# Patient Record
Sex: MALE | Race: WHITE | NOT HISPANIC OR LATINO | Employment: OTHER | URBAN - METROPOLITAN AREA
[De-identification: names, ages, dates, MRNs, and addresses within clinical notes are randomized per-mention and may not be internally consistent; named-entity substitution may affect disease eponyms.]

---

## 2024-08-16 ENCOUNTER — TRANSFERRED RECORDS (OUTPATIENT)
Dept: HEALTH INFORMATION MANAGEMENT | Facility: CLINIC | Age: 69
End: 2024-08-16

## 2024-08-20 ENCOUNTER — PATIENT OUTREACH (OUTPATIENT)
Dept: ONCOLOGY | Facility: CLINIC | Age: 69
End: 2024-08-20

## 2024-08-20 ENCOUNTER — TRANSCRIBE ORDERS (OUTPATIENT)
Dept: OTHER | Age: 69
End: 2024-08-20

## 2024-08-20 DIAGNOSIS — R91.8 PULMONARY NODULES: Primary | ICD-10-CM

## 2024-08-20 DIAGNOSIS — R91.1 SOLITARY PULMONARY NODULE: Primary | ICD-10-CM

## 2024-08-20 NOTE — PROGRESS NOTES
New IP (Interventional Pulmonology) referral rec'd.  Chart reviewed.    IP (Interventional Pulmonology) team notified by IB message to ask add on for urgent request      New Patient: Interventional Pulmonary (Lung nodule) Nurse Navigator Note    Referring provider: Lb Espinal affiliated with [facility] at [address] [place] [address].     Referred to (specialty): Interventional Pulmonary (Lung nodule)    Requested provider (if applicable): n/a    Date Referral Received: 8/20/2024    Evaluation for :  Per referral, needs to be seen within the next 15 days. Need for robotic assisted bronchoscopy for biopsy of a left upper lobe speculated lesion  [facility] Authorization Number: [id number]    Clinical History (per Nurse review of records provided):    **BOOK MARKED**    7/31/2024:  CT Chest    [facility] [place])    RECORDS NEEDED:     Last FIVE years CHEST imaging pushed [facility] PACS [facility]  ALL imaging last 3 months from [facility]  Last ONE year PFT's, if any  --thank you!!    Additional testing needed prior to consult: PFT's

## 2024-08-21 ENCOUNTER — PRE VISIT (OUTPATIENT)
Dept: ONCOLOGY | Facility: CLINIC | Age: 69
End: 2024-08-21

## 2024-08-21 NOTE — TELEPHONE ENCOUNTER
RECORDS STATUS - ALL OTHER DIAGNOSIS      Action August 21, 2024 10:31 AM    Action Taken - Called [facility] and push image to  PACS.   - Called [facility] and ask for image to be push.     - Req is faxed to [facility], for records and images.      Imaging Received  August 21, 2024 10:41 AM    Action: Images from [facility] and [facility] received and resolved to PACS.     RECORDS NEEDED:     Last FIVE years CHEST imaging [facility]ACS [facility]  ALL imaging last 3 months from [facility]  Last ONE year PFT's, if any   RECORDS RECEIVED FROM: [facility]   NOTES STATUS DETAILS   OFFICE NOTE from referring provider External: [facility] Dr[facility] Lb Espinal   OFFICE NOTE from medical oncologist     OFFICE NOTE from other specialist     OPERATIVE REPORT     MEDICATION LIST External: [facility]    LABS     PATHOLOGY REPORTS     [facility] 8[facility]   PATHOLOGY FEDEX TRACKING   Tracking #:   IMAGING (NEED IMAGES & REPORT)     CT SCANS (Img req from /[facility]) RM:  8/19/24: CT Neck    [facility]:  6/23/24: CT Brain   ULTRASOUND (Img req from ) RM:  8/12/24: US FNA

## 2024-08-22 ENCOUNTER — TRANSFERRED RECORDS (OUTPATIENT)
Dept: HEALTH INFORMATION MANAGEMENT | Facility: CLINIC | Age: 69
End: 2024-08-22

## 2024-08-23 ENCOUNTER — VIRTUAL VISIT (OUTPATIENT)
Dept: PULMONOLOGY | Facility: CLINIC | Age: 69
End: 2024-08-23
Attending: INTERNAL MEDICINE
Payer: COMMERCIAL

## 2024-08-23 ENCOUNTER — HOSPITAL ENCOUNTER (OUTPATIENT)
Dept: GENERAL RADIOLOGY | Facility: CLINIC | Age: 69
Discharge: HOME | End: 2024-08-23
Attending: PHYSICIAN ASSISTANT
Payer: COMMERCIAL

## 2024-08-23 DIAGNOSIS — R91.1 SOLITARY PULMONARY NODULE: ICD-10-CM

## 2024-08-23 DIAGNOSIS — F17.208 NICOTINE DEPENDENCE WITH OTHER NICOTINE-INDUCED DISORDER, UNSPECIFIED NICOTINE PRODUCT TYPE: Primary | ICD-10-CM

## 2024-08-23 PROBLEM — B19.20 VIRAL HEPATITIS C: Status: ACTIVE | Noted: 2024-08-23

## 2024-08-23 PROBLEM — M54.50 LOW BACK PAIN: Status: ACTIVE | Noted: 2020-06-04

## 2024-08-23 PROBLEM — H52.4 PRESBYOPIA: Status: ACTIVE | Noted: 2024-08-23

## 2024-08-23 PROBLEM — I10 ESSENTIAL (PRIMARY) HYPERTENSION: Status: ACTIVE | Noted: 2024-08-23

## 2024-08-23 PROBLEM — D13.4 BENIGN NEOPLASM OF LIVER: Status: ACTIVE | Noted: 2024-08-23

## 2024-08-23 PROBLEM — H25.13 AGE-RELATED NUCLEAR CATARACT, BILATERAL: Status: ACTIVE | Noted: 2024-08-23

## 2024-08-23 PROBLEM — M19.90 UNSPECIFIED OSTEOARTHRITIS, UNSPECIFIED SITE: Status: ACTIVE | Noted: 2024-08-23

## 2024-08-23 PROBLEM — M19.011 ARTHRITIS OF RIGHT SHOULDER REGION: Status: ACTIVE | Noted: 2022-12-23

## 2024-08-23 PROBLEM — F10.10 ALCOHOL ABUSE: Status: ACTIVE | Noted: 2024-08-23

## 2024-08-23 PROBLEM — N40.0 BENIGN PROSTATIC HYPERPLASIA WITHOUT URINARY OBSTRUCTION: Status: ACTIVE | Noted: 2024-08-23

## 2024-08-23 PROBLEM — M75.101 TEAR OF RIGHT ROTATOR CUFF: Status: ACTIVE | Noted: 2024-08-23

## 2024-08-23 PROBLEM — I10 ESSENTIAL HYPERTENSION: Status: ACTIVE | Noted: 2024-08-23

## 2024-08-23 PROBLEM — M75.111 INCOMPLETE ROTATOR CUFF TEAR OR RUPTURE OF RIGHT SHOULDER, NOT SPECIFIED AS TRAUMATIC: Status: ACTIVE | Noted: 2024-08-23

## 2024-08-23 PROBLEM — R73.01 IMPAIRED FASTING GLUCOSE: Status: ACTIVE | Noted: 2024-08-23

## 2024-08-23 PROBLEM — R68.89 OTHER GENERAL SYMPTOMS AND SIGNS: Status: ACTIVE | Noted: 2024-08-23

## 2024-08-23 PROBLEM — H93.19 TINNITUS: Status: ACTIVE | Noted: 2024-08-23

## 2024-08-23 PROBLEM — S51.811D FOREARM LACERATION, RIGHT, SUBSEQUENT ENCOUNTER: Status: ACTIVE | Noted: 2021-10-08

## 2024-08-23 PROBLEM — M20.20 ACQUIRED HALLUX RIGIDUS: Status: ACTIVE | Noted: 2024-08-23

## 2024-08-23 PROBLEM — Z96.611 PRESENCE OF RIGHT ARTIFICIAL SHOULDER JOINT: Status: ACTIVE | Noted: 2024-08-23

## 2024-08-23 PROBLEM — M19.90 OSTEOARTHROSIS: Status: ACTIVE | Noted: 2024-08-23

## 2024-08-23 PROBLEM — R51.9 HEADACHE, UNSPECIFIED: Status: ACTIVE | Noted: 2024-08-23

## 2024-08-23 PROBLEM — E78.5 HYPERLIPIDEMIA: Status: ACTIVE | Noted: 2024-08-23

## 2024-08-23 PROBLEM — J01.90 ACUTE SINUSITIS, UNSPECIFIED: Status: ACTIVE | Noted: 2024-08-23

## 2024-08-23 PROCEDURE — 70491 CT SOFT TISSUE NECK W/DYE: CPT | Mod: 26 | Performed by: RADIOLOGY

## 2024-08-23 PROCEDURE — 99204 OFFICE O/P NEW MOD 45 MIN: CPT | Mod: 25 | Performed by: PHYSICIAN ASSISTANT

## 2024-08-23 PROCEDURE — 999N000122 CT MHEALTH OVERREAD

## 2024-08-23 PROCEDURE — 99406 BEHAV CHNG SMOKING 3-10 MIN: CPT | Performed by: PHYSICIAN ASSISTANT

## 2024-08-23 NOTE — NURSING NOTE
Current patient location: [address]  [facility] [address]    Is the patient currently in the state of [place]? YES    Visit mode:VIDEO    If the visit is dropped, the patient can be reconnected by: VIDEO VISIT: Text to cell phone:   Telephone Information:   Mobile [phone]       Will anyone else be joining the visit? Yes, spouse Crystal  (If patient encounters technical issues they should call [phone]    How would you like to obtain your AVS? MyChart    Are changes needed to the allergy or medication list? Yes Add low dose Aspirin, Vitamin D3, Dextromethorphan, Sinus rinse, Albuterol inhaler, Atorvastatin, Diltiazem, Flonase, Ibuprofen, Hydrochlorthiazide, Loratadine, Nicotene lozenges.      Are refills needed on medications prescribed by this physician? NO    Rooming Documentation:  Questionnaire(s) completed    Reason for visit: Consult    SIOBHAN HAMILTON

## 2024-08-23 NOTE — PROGRESS NOTES
LUNG NODULE & INTERVENTIONAL PULMONARY CLINIC  [facility], [facility]     Boby Ngo MRN# [id number]   Age: 68 year old YOB: 1955     Reason for Consultation: lung nodule(s)    Requesting Physician: Lb Espinal MD  [address] [place] [address]       Assessment and Plan:    1. 28mm MIMI spiculated mass. CT neck 7/19/2024 with partially visualized MIMI spiculated mass 24 x 21 mm. CT chest 7/31/2024 with persistent MIMI mass measuring 28mm. 8/19/2024 CT neck with partially imaged left upper lobe spiculated nodule. On further review, he had a CXR 3/2024 which does appear to demonstrate a MIMI mass but it is difficult to ascertain its size, not clearly seen on CXR 2/2015. It may be accessible via IR but since it is tethered to the adjacent thickened pleura I think a navigational bronchoscopy may be of higher yield. Recommend navigational bronchoscopy and biopsy of the MIMI nodule with EBUS/lymph node biopsy.  Also ordered CT [facility] overread to assess for other lung nodules.    Needs Pre-Op Anesthesia [facility] the [facility] which we discussed. Hold Celebrex, indomethacin, and Ibuprofen/all NSAIDS for 24 hours. Does not need to hold ASA 81mg daily.     During the patient's visit today we reviewed their imaging in detail. We discussed the process of an ion navigational bronchoscopy including need for a CT chest the day of the procedure for mapping purposes. We also reviewed possible risks of the procedure including pneumothorax, bleed, infection. Patient's wife is going to drive them to/from the procedure.  Will review results with patient over the phone, usually wihtin 1 week of the procedure. If positive for malignancy will order MRI brain and PET scan for staging, and referral to Oncology (through the [facility] or [facility])    2. Emphysema. Endorses ROBINS and cough. Scheduled for PFTs today at 3pm through the [facility]. Follow up with [facility] PCP regarding respiratory symptoms, may  benefit from LAMA-LABA. Continue current albuterol.     3. Tobacco use. I spent 5 minutes counseling patient on benefits of quitting tobacco. We discussed impact and health consequences of smoking. Patient states they are interested in quitting. He has nicotine lozenges and declined pharmacotherapy options today.     4. 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 Roldan over the phone, who agrees with the above plan.     Karen Anguiano PA-C  Interventional and General Pulmonology  Department of Pulmonary, Allergy, Critical Care and Sleep Medicine   [facility]     45 minutes spent reviewing chart, reviewing test results, talking with and examining patient, formulating plan, and documentation on the day of the encounter. Additional 5 min discussing smoking cessation.         History:     Boby Ngo is a 68 year old male with who is here for lung mass. Here with his wife.     He was having severe headaches in June 2024, had a CT head which looked normal but partially visualized a MIMI nodule. He also had a parotid lesion for which he underwent FNA 8/12/2024, cytology consistent with Warthin tumor. After the biopsy he had swelling/concern for hematoma.     He endorses shortness of breath if he walks or goes up and down steps. Once he sits down he is able to catch his breath. He has an albuterol inhaler which he takes PRN with over-exertion maybe 2-3x per day which helps. Endorses coughing attack worse in the morning and less at night, throughout the day its not that bad. Productive with clear phlegm.     - Personal hx of cancer: no  - Family hx of cancer: dad might have had prostate cancer but has limited information about his family history  - Exposure hx: Denies asbestos or radon exposure   - Tobacco hx: Current Smoker: 1ppd for 51years. Realizes he has to quit and has nicorette lozenges which he hasn't used yet but historically they were able. Patches  ineffective.   - My interpretation of the images relevant for this visit includes: MIMI spiculated nodule.    - My interpretation of the PFT's relevant for this visit includes: None       Other pertinent active medical problems include:   - HTN, HLD  - Alcohol use, drinking 4 beers per day   - hepatitis C in ~ 2013 s/p treatment  - liver hemangioma          Past Medical History:    No past medical history on file.        Past Surgical History:    No past surgical history on file.       Social History:     Social History     Tobacco Use    Smoking status: Not on file    Smokeless tobacco: Not on file   Substance Use Topics    Alcohol use: Not on file          Family History:   No family history on file.        Allergies:    Not on File       Medications:     No current outpatient medications on file.     No current facility-administered medications for this visit.            Physical Exam:   There were no vitals taken for this visit.  Wt Readings from Last 4 Encounters:   No data found for Wt     General: Well appearing  Lungs: Nonlabored breathing  Neuro: Answering questions appropriately  Psych: Normal affect       Imaging/Lab Data   All laboratory and imaging data reviewed.    No results found for this or any previous visit (from the past 24 hour(s)).

## 2024-08-23 NOTE — PROGRESS NOTES
Virtual Visit Details    Type of service:  Video Visit     Originating Location (pt. Location): Home    Distant Location (provider location):  On-site  Platform used for Video Visit: Lidia

## 2024-08-26 ENCOUNTER — TRANSFERRED RECORDS (OUTPATIENT)
Dept: HEALTH INFORMATION MANAGEMENT | Facility: CLINIC | Age: 69
End: 2024-08-26

## 2024-08-27 ENCOUNTER — TRANSFERRED RECORDS (OUTPATIENT)
Dept: HEALTH INFORMATION MANAGEMENT | Facility: CLINIC | Age: 69
End: 2024-08-27

## 2024-08-27 ENCOUNTER — TELEPHONE (OUTPATIENT)
Dept: PULMONOLOGY | Facility: CLINIC | Age: 69
End: 2024-08-27

## 2024-08-27 LAB
ALT SERPL-CCNC: 16 U/L (ref 0–55)
AST SERPL-CCNC: 32 U/L (ref 5–40)
CREATININE (EXTERNAL): 0.9 MG/DL (ref 0.5–1.5)
GFR ESTIMATED (EXTERNAL): >90 ML/MIN/1.73
GLUCOSE (EXTERNAL): 140 MG/DL (ref 70–180)
POTASSIUM (EXTERNAL): 4.8 MMOL/L (ref 3.5–5)

## 2024-08-27 NOTE — TELEPHONE ENCOUNTER
Writer contacted patient to schedule IP procedure. Scheduled 09/16 with Dr. Gutiérrez. Per patient, an H&P was completed 08/27. Packet information sent via [employer] and mail per patient request.    Carl Leyva on 8/27/2024 at 1:25 PM

## 2024-09-04 ENCOUNTER — TELEPHONE (OUTPATIENT)
Dept: PULMONOLOGY | Facility: CLINIC | Age: 69
End: 2024-09-04

## 2024-09-12 RX ORDER — ATORVASTATIN CALCIUM 20 MG/1
20 TABLET, FILM COATED ORAL AT BEDTIME
COMMUNITY
Start: 2024-03-19

## 2024-09-12 RX ORDER — DILTIAZEM HYDROCHLORIDE 120 MG/1
120 CAPSULE, EXTENDED RELEASE ORAL EVERY 12 HOURS
COMMUNITY
Start: 2024-07-03 | End: 2024-09-12

## 2024-09-12 RX ORDER — CELECOXIB 200 MG/1
200 CAPSULE ORAL 2 TIMES DAILY
COMMUNITY
End: 2024-09-12

## 2024-09-12 RX ORDER — DOXYCYCLINE HYCLATE 100 MG
100 TABLET ORAL 2 TIMES DAILY
COMMUNITY
Start: 2024-06-18

## 2024-09-12 RX ORDER — TAMSULOSIN HYDROCHLORIDE 0.4 MG/1
0.4 CAPSULE ORAL DAILY
COMMUNITY
Start: 2024-03-19

## 2024-09-12 RX ORDER — ASPIRIN 81 MG/1
81 TABLET, CHEWABLE ORAL DAILY
COMMUNITY
Start: 2023-03-24

## 2024-09-12 RX ORDER — CETIRIZINE HYDROCHLORIDE 10 MG/1
1 TABLET ORAL EVERY MORNING
COMMUNITY
Start: 2024-06-18 | End: 2024-09-12

## 2024-09-12 RX ORDER — LANOLIN ALCOHOL/MO/W.PET/CERES
3 CREAM (GRAM) TOPICAL
COMMUNITY
End: 2024-09-12

## 2024-09-12 RX ORDER — AMPICILLIN TRIHYDRATE 500 MG
1000 CAPSULE ORAL DAILY
COMMUNITY

## 2024-09-12 RX ORDER — LORATADINE 10 MG/1
10 TABLET ORAL DAILY PRN
COMMUNITY
Start: 2024-07-03 | End: 2024-09-12

## 2024-09-12 RX ORDER — FLUTICASONE PROPIONATE 50 MCG
2 SPRAY, SUSPENSION (ML) NASAL
COMMUNITY
Start: 2024-07-03

## 2024-09-12 RX ORDER — ALBUTEROL SULFATE 90 UG/1
2 AEROSOL, METERED RESPIRATORY (INHALATION) EVERY 4 HOURS PRN
COMMUNITY
Start: 2024-03-19

## 2024-09-12 RX ORDER — LISINOPRIL AND HYDROCHLOROTHIAZIDE 12.5; 2 MG/1; MG/1
1 TABLET ORAL DAILY
COMMUNITY
Start: 2024-03-19

## 2024-09-12 RX ORDER — RIZATRIPTAN BENZOATE 10 MG/1
10 TABLET ORAL
COMMUNITY
Start: 2024-07-03

## 2024-09-15 ENCOUNTER — ANESTHESIA EVENT (OUTPATIENT)
Dept: SURGERY | Facility: CLINIC | Age: 69
End: 2024-09-15
Payer: COMMERCIAL

## 2024-09-15 RX ORDER — ONDANSETRON 2 MG/ML
4 INJECTION INTRAMUSCULAR; INTRAVENOUS EVERY 30 MIN PRN
Status: CANCELLED | OUTPATIENT
Start: 2024-09-15

## 2024-09-15 RX ORDER — FENTANYL CITRATE 50 UG/ML
25 INJECTION, SOLUTION INTRAMUSCULAR; INTRAVENOUS
Status: CANCELLED | OUTPATIENT
Start: 2024-09-15

## 2024-09-15 RX ORDER — NALOXONE HYDROCHLORIDE 0.4 MG/ML
0.1 INJECTION, SOLUTION INTRAMUSCULAR; INTRAVENOUS; SUBCUTANEOUS
Status: CANCELLED | OUTPATIENT
Start: 2024-09-15

## 2024-09-15 RX ORDER — DEXAMETHASONE SODIUM PHOSPHATE 4 MG/ML
4 INJECTION, SOLUTION INTRA-ARTICULAR; INTRALESIONAL; INTRAMUSCULAR; INTRAVENOUS; SOFT TISSUE
Status: CANCELLED | OUTPATIENT
Start: 2024-09-15

## 2024-09-15 RX ORDER — ONDANSETRON 4 MG/1
4 TABLET, ORALLY DISINTEGRATING ORAL EVERY 30 MIN PRN
Status: CANCELLED | OUTPATIENT
Start: 2024-09-15

## 2024-09-15 RX ORDER — OXYCODONE HYDROCHLORIDE 5 MG/1
5 TABLET ORAL
Status: CANCELLED | OUTPATIENT
Start: 2024-09-15

## 2024-09-16 ENCOUNTER — ANESTHESIA (OUTPATIENT)
Dept: SURGERY | Facility: CLINIC | Age: 69
End: 2024-09-16
Payer: COMMERCIAL

## 2024-09-16 ENCOUNTER — TELEPHONE (OUTPATIENT)
Dept: PULMONOLOGY | Facility: CLINIC | Age: 69
End: 2024-09-16

## 2024-09-16 ENCOUNTER — APPOINTMENT (OUTPATIENT)
Dept: GENERAL RADIOLOGY | Facility: CLINIC | Age: 69
End: 2024-09-16
Attending: STUDENT IN AN ORGANIZED HEALTH CARE EDUCATION/TRAINING PROGRAM
Payer: COMMERCIAL

## 2024-09-16 ENCOUNTER — HOSPITAL ENCOUNTER (OUTPATIENT)
Dept: CT IMAGING | Facility: CLINIC | Age: 69
Discharge: HOME | End: 2024-09-16
Attending: PHYSICIAN ASSISTANT | Admitting: INTERNAL MEDICINE
Payer: COMMERCIAL

## 2024-09-16 ENCOUNTER — APPOINTMENT (OUTPATIENT)
Dept: GENERAL RADIOLOGY | Facility: CLINIC | Age: 69
End: 2024-09-16
Attending: INTERNAL MEDICINE
Payer: COMMERCIAL

## 2024-09-16 ENCOUNTER — HOSPITAL ENCOUNTER (OUTPATIENT)
Facility: CLINIC | Age: 69
Discharge: HOME | End: 2024-09-16
Attending: INTERNAL MEDICINE | Admitting: INTERNAL MEDICINE
Payer: COMMERCIAL

## 2024-09-16 VITALS
HEART RATE: 60 BPM | SYSTOLIC BLOOD PRESSURE: 132 MMHG | DIASTOLIC BLOOD PRESSURE: 72 MMHG | BODY MASS INDEX: 24.73 KG/M2 | TEMPERATURE: 98 F | RESPIRATION RATE: 15 BRPM | WEIGHT: 163.14 LBS | HEIGHT: 68 IN | OXYGEN SATURATION: 96 %

## 2024-09-16 DIAGNOSIS — R91.1 SOLITARY PULMONARY NODULE: ICD-10-CM

## 2024-09-16 PROCEDURE — 999N000179 XR SURGERY CARM FLUORO LESS THAN 5 MIN W STILLS: Mod: TC

## 2024-09-16 PROCEDURE — 88172 CYTP DX EVAL FNA 1ST EA SITE: CPT | Mod: 26 | Performed by: PATHOLOGY

## 2024-09-16 PROCEDURE — 31653 BRONCH EBUS SAMPLNG 3/> NODE: CPT | Mod: GC | Performed by: INTERNAL MEDICINE

## 2024-09-16 PROCEDURE — 250N000009 HC RX 250

## 2024-09-16 PROCEDURE — 250N000009 HC RX 250: Performed by: NURSE ANESTHETIST, CERTIFIED REGISTERED

## 2024-09-16 PROCEDURE — 999N000141 HC STATISTIC PRE-PROCEDURE NURSING ASSESSMENT: Performed by: INTERNAL MEDICINE

## 2024-09-16 PROCEDURE — 31625 BRONCHOSCOPY W/BIOPSY(S): CPT | Performed by: ANESTHESIOLOGY

## 2024-09-16 PROCEDURE — 71045 X-RAY EXAM CHEST 1 VIEW: CPT | Mod: 26 | Performed by: RADIOLOGY

## 2024-09-16 PROCEDURE — 710N000012 HC RECOVERY PHASE 2, PER MINUTE: Performed by: INTERNAL MEDICINE

## 2024-09-16 PROCEDURE — 31654 BRONCH EBUS IVNTJ PERPH LES: CPT | Mod: GC | Performed by: INTERNAL MEDICINE

## 2024-09-16 PROCEDURE — 31629 BRONCHOSCOPY/NEEDLE BX EACH: CPT | Mod: GC | Performed by: INTERNAL MEDICINE

## 2024-09-16 PROCEDURE — 88341 IMHCHEM/IMCYTCHM EA ADD ANTB: CPT | Mod: 26 | Performed by: PATHOLOGY

## 2024-09-16 PROCEDURE — 31627 NAVIGATIONAL BRONCHOSCOPY: CPT | Mod: GC | Performed by: INTERNAL MEDICINE

## 2024-09-16 PROCEDURE — 258N000003 HC RX IP 258 OP 636: Performed by: NURSE ANESTHETIST, CERTIFIED REGISTERED

## 2024-09-16 PROCEDURE — 250N000013 HC RX MED GY IP 250 OP 250 PS 637

## 2024-09-16 PROCEDURE — 88305 TISSUE EXAM BY PATHOLOGIST: CPT | Mod: 26 | Performed by: PATHOLOGY

## 2024-09-16 PROCEDURE — 88344 IMHCHEM/IMCYTCHM EA MLT ANTB: CPT | Mod: TC,XU | Performed by: INTERNAL MEDICINE

## 2024-09-16 PROCEDURE — 71250 CT THORAX DX C-: CPT

## 2024-09-16 PROCEDURE — 360N000087 HC SURGERY LEVEL 7 W/ FLUORO, PER MIN: Performed by: INTERNAL MEDICINE

## 2024-09-16 PROCEDURE — 71250 CT THORAX DX C-: CPT | Mod: 26 | Performed by: RADIOLOGY

## 2024-09-16 PROCEDURE — 250N000011 HC RX IP 250 OP 636: Performed by: NURSE ANESTHETIST, CERTIFIED REGISTERED

## 2024-09-16 PROCEDURE — 710N000010 HC RECOVERY PHASE 1, LEVEL 2, PER MIN: Performed by: INTERNAL MEDICINE

## 2024-09-16 PROCEDURE — 88173 CYTOPATH EVAL FNA REPORT: CPT | Mod: 26 | Performed by: PATHOLOGY

## 2024-09-16 PROCEDURE — 250N000025 HC SEVOFLURANE, PER MIN: Performed by: INTERNAL MEDICINE

## 2024-09-16 PROCEDURE — 88342 IMHCHEM/IMCYTCHM 1ST ANTB: CPT | Mod: 26 | Performed by: PATHOLOGY

## 2024-09-16 PROCEDURE — 999N000065 XR CHEST PORT 1 VIEW

## 2024-09-16 PROCEDURE — 272N000001 HC OR GENERAL SUPPLY STERILE: Performed by: INTERNAL MEDICINE

## 2024-09-16 PROCEDURE — 370N000017 HC ANESTHESIA TECHNICAL FEE, PER MIN: Performed by: INTERNAL MEDICINE

## 2024-09-16 PROCEDURE — 31625 BRONCHOSCOPY W/BIOPSY(S): CPT | Performed by: NURSE ANESTHETIST, CERTIFIED REGISTERED

## 2024-09-16 PROCEDURE — 88360 TUMOR IMMUNOHISTOCHEM/MANUAL: CPT | Mod: 26 | Performed by: PATHOLOGY

## 2024-09-16 RX ORDER — FENTANYL CITRATE 50 UG/ML
50 INJECTION, SOLUTION INTRAMUSCULAR; INTRAVENOUS EVERY 5 MIN PRN
Status: DISCONTINUED | OUTPATIENT
Start: 2024-09-16 | End: 2024-09-16 | Stop reason: HOSPADM

## 2024-09-16 RX ORDER — VASOPRESSIN IN 0.9 % NACL 2 UNIT/2ML
SYRINGE (ML) INTRAVENOUS PRN
Status: DISCONTINUED | OUTPATIENT
Start: 2024-09-16 | End: 2024-09-16

## 2024-09-16 RX ORDER — LIDOCAINE 40 MG/G
CREAM TOPICAL
Status: DISCONTINUED | OUTPATIENT
Start: 2024-09-16 | End: 2024-09-16 | Stop reason: HOSPADM

## 2024-09-16 RX ORDER — SODIUM CHLORIDE, SODIUM LACTATE, POTASSIUM CHLORIDE, CALCIUM CHLORIDE 600; 310; 30; 20 MG/100ML; MG/100ML; MG/100ML; MG/100ML
INJECTION, SOLUTION INTRAVENOUS CONTINUOUS PRN
Status: DISCONTINUED | OUTPATIENT
Start: 2024-09-16 | End: 2024-09-16

## 2024-09-16 RX ORDER — HYDROMORPHONE HCL IN WATER/PF 6 MG/30 ML
0.2 PATIENT CONTROLLED ANALGESIA SYRINGE INTRAVENOUS EVERY 5 MIN PRN
Status: DISCONTINUED | OUTPATIENT
Start: 2024-09-16 | End: 2024-09-16 | Stop reason: HOSPADM

## 2024-09-16 RX ORDER — SODIUM CHLORIDE, SODIUM LACTATE, POTASSIUM CHLORIDE, CALCIUM CHLORIDE 600; 310; 30; 20 MG/100ML; MG/100ML; MG/100ML; MG/100ML
INJECTION, SOLUTION INTRAVENOUS CONTINUOUS
Status: DISCONTINUED | OUTPATIENT
Start: 2024-09-16 | End: 2024-09-16 | Stop reason: HOSPADM

## 2024-09-16 RX ORDER — DEXAMETHASONE SODIUM PHOSPHATE 4 MG/ML
INJECTION, SOLUTION INTRA-ARTICULAR; INTRALESIONAL; INTRAMUSCULAR; INTRAVENOUS; SOFT TISSUE PRN
Status: DISCONTINUED | OUTPATIENT
Start: 2024-09-16 | End: 2024-09-16

## 2024-09-16 RX ORDER — ONDANSETRON 2 MG/ML
INJECTION INTRAMUSCULAR; INTRAVENOUS PRN
Status: DISCONTINUED | OUTPATIENT
Start: 2024-09-16 | End: 2024-09-16

## 2024-09-16 RX ORDER — FENTANYL CITRATE 50 UG/ML
INJECTION, SOLUTION INTRAMUSCULAR; INTRAVENOUS PRN
Status: DISCONTINUED | OUTPATIENT
Start: 2024-09-16 | End: 2024-09-16

## 2024-09-16 RX ORDER — PROPOFOL 10 MG/ML
INJECTION, EMULSION INTRAVENOUS CONTINUOUS PRN
Status: DISCONTINUED | OUTPATIENT
Start: 2024-09-16 | End: 2024-09-16

## 2024-09-16 RX ORDER — PROPOFOL 10 MG/ML
INJECTION, EMULSION INTRAVENOUS PRN
Status: DISCONTINUED | OUTPATIENT
Start: 2024-09-16 | End: 2024-09-16

## 2024-09-16 RX ORDER — NALOXONE HYDROCHLORIDE 0.4 MG/ML
0.1 INJECTION, SOLUTION INTRAMUSCULAR; INTRAVENOUS; SUBCUTANEOUS
Status: DISCONTINUED | OUTPATIENT
Start: 2024-09-16 | End: 2024-09-16 | Stop reason: HOSPADM

## 2024-09-16 RX ORDER — DEXAMETHASONE SODIUM PHOSPHATE 4 MG/ML
4 INJECTION, SOLUTION INTRA-ARTICULAR; INTRALESIONAL; INTRAMUSCULAR; INTRAVENOUS; SOFT TISSUE
Status: DISCONTINUED | OUTPATIENT
Start: 2024-09-16 | End: 2024-09-16 | Stop reason: HOSPADM

## 2024-09-16 RX ORDER — FENTANYL CITRATE 50 UG/ML
25 INJECTION, SOLUTION INTRAMUSCULAR; INTRAVENOUS EVERY 5 MIN PRN
Status: DISCONTINUED | OUTPATIENT
Start: 2024-09-16 | End: 2024-09-16 | Stop reason: HOSPADM

## 2024-09-16 RX ORDER — HYDROMORPHONE HCL IN WATER/PF 6 MG/30 ML
0.4 PATIENT CONTROLLED ANALGESIA SYRINGE INTRAVENOUS EVERY 5 MIN PRN
Status: DISCONTINUED | OUTPATIENT
Start: 2024-09-16 | End: 2024-09-16 | Stop reason: HOSPADM

## 2024-09-16 RX ADMIN — MIDAZOLAM 1 MG: 1 INJECTION INTRAMUSCULAR; INTRAVENOUS at 13:01

## 2024-09-16 RX ADMIN — PHENYLEPHRINE HYDROCHLORIDE 100 MCG: 10 INJECTION INTRAVENOUS at 13:38

## 2024-09-16 RX ADMIN — Medication 10 MG: at 14:10

## 2024-09-16 RX ADMIN — SUGAMMADEX 200 MG: 100 INJECTION, SOLUTION INTRAVENOUS at 14:25

## 2024-09-16 RX ADMIN — FENTANYL CITRATE 100 MCG: 50 INJECTION INTRAMUSCULAR; INTRAVENOUS at 13:14

## 2024-09-16 RX ADMIN — ONDANSETRON 4 MG: 2 INJECTION INTRAMUSCULAR; INTRAVENOUS at 14:23

## 2024-09-16 RX ADMIN — SODIUM CHLORIDE, POTASSIUM CHLORIDE, SODIUM LACTATE AND CALCIUM CHLORIDE: 600; 310; 30; 20 INJECTION, SOLUTION INTRAVENOUS at 14:00

## 2024-09-16 RX ADMIN — SODIUM CHLORIDE, POTASSIUM CHLORIDE, SODIUM LACTATE AND CALCIUM CHLORIDE: 600; 310; 30; 20 INJECTION, SOLUTION INTRAVENOUS at 13:01

## 2024-09-16 RX ADMIN — LIDOCAINE HYDROCHLORIDE 3 ML: 40 INJECTION, SOLUTION RETROBULBAR; TOPICAL at 15:43

## 2024-09-16 RX ADMIN — DEXAMETHASONE SODIUM PHOSPHATE 4 MG: 4 INJECTION, SOLUTION INTRA-ARTICULAR; INTRALESIONAL; INTRAMUSCULAR; INTRAVENOUS; SOFT TISSUE at 13:01

## 2024-09-16 RX ADMIN — Medication 1 LOZENGE: at 14:50

## 2024-09-16 RX ADMIN — PROPOFOL 150 MCG/KG/MIN: 10 INJECTION, EMULSION INTRAVENOUS at 13:20

## 2024-09-16 RX ADMIN — Medication 0.5 UNITS: at 13:42

## 2024-09-16 RX ADMIN — Medication 1 UNITS: at 14:04

## 2024-09-16 RX ADMIN — Medication 50 MG: at 13:14

## 2024-09-16 RX ADMIN — PROPOFOL 150 MG: 10 INJECTION, EMULSION INTRAVENOUS at 13:14

## 2024-09-16 RX ADMIN — HYDROMORPHONE HYDROCHLORIDE 0.5 MG: 1 INJECTION, SOLUTION INTRAMUSCULAR; INTRAVENOUS; SUBCUTANEOUS at 13:14

## 2024-09-16 ASSESSMENT — ACTIVITIES OF DAILY LIVING (ADL)
ADLS_ACUITY_SCORE: 31

## 2024-09-16 ASSESSMENT — COPD QUESTIONNAIRES: COPD: 1

## 2024-09-16 ASSESSMENT — LIFESTYLE VARIABLES: TOBACCO_USE: 1

## 2024-09-16 NOTE — ANESTHESIA CARE TRANSFER NOTE
Patient: Boby Ngo    Procedure: Procedure(s):  BRONCHOSCOPY, ROBOT-ASSISTED, WITH BIOPSY Left Upper Lobe mass and Endobronchial Ultrasound/lymph node biopsy       Diagnosis: Solitary pulmonary nodule [R91.1]  Diagnosis Additional Information: No value filed.    Anesthesia Type:   General     Note:    Oropharynx: oropharynx clear of all foreign objects  Level of Consciousness: awake    Level of Supplemental Oxygen (L/min / FiO2): 6  Independent Airway: airway patency satisfactory and stable  Dentition: dentition unchanged  Vital Signs Stable: post-procedure vital signs reviewed and stable  Report to RN Given: handoff report given  Patient transferred to: PACU    Handoff Report: Identifed the Patient, Identified the Reponsible Provider, Reviewed the pertinent medical history, Discussed the surgical course, Reviewed Intra-OP anesthesia mangement and issues during anesthesia, Set expectations for post-procedure period and Allowed opportunity for questions and acknowledgement of understanding    Vitals:  Vitals Value Taken Time   /88 09/16/24 1436   Temp     Pulse 79 09/16/24 1438   Resp 17 09/16/24 1438   SpO2 97 % 09/16/24 1438   Vitals shown include unfiled device data.    Electronically Signed By: MOO Barnes CRNA  September 16, 2024  2:39 PM

## 2024-09-16 NOTE — PROCEDURES
INTERVENTIONAL PULMONOLOGY       Procedure(s):    A flexible bronchoscopy  Airway exam  EBUS-TBNA (5 sites)  Fluoroscopic guidance   Radial EBUS Navigation (1 sites)  Therapeutic suctioning (2 sites)  Transbronchial fine needle aspiration (1 sites)  ION Robotic Bronchoscopy     Indication:  MIMI nodule     Attending of Record:  Remberto Gutiérrez MD    Interventional Pulmonary Fellow   Bonny Arreola    Trainees Present:   None     Medications:    General Anesthesia - See anesthesia flowsheet for details    Sedation Time:   Per Anesthesia Care Provider    Time Out:  Performed    The patient's medical record has been reviewed.  The indication for the procedure was reviewed.  The necessary history and physical examination was performed and reviewed.  The risks, benefits and alternatives of the procedure were discussed with the the patient in detail and he had the opportunity to ask questions.  I discussed in particular the potential complications including risks of minor or life-threatening bleeding and/or infection, respiratory failure, vocal cord trauma / paralysis, pneumothorax, and discomfort. Sedation risks were also discussed including abnormal heart rhythms, low blood pressure, and respiratory failure. All questions were answered to the best of my ability.  Verbal and written informed consent was obtained.  The proposed procedure and the patient's identification were verified prior to the procedure by the physician and the nurse.    The patient was assessed for the adequacy for the procedure and to receive medications.   Mental Status:  Alert and oriented x 3  Airway examination:  Class I (Complete visualization of soft palate)  Pulmonary:  Non labored respirations  CV:  Regular rate  ASA Grade:  (II)  Mild systemic disease    After clinical evaluation and reviewing the indication, risks, alternatives and benefits of the procedure the patient was deemed to be in satisfactory condition to undergo the  procedure.      Immediately before administration of medications the patient was re-assessed for adequacy to receive sedatives including the heart rate, respiratory rate, mental status, oxygen saturation, blood pressure and adequacy of pulmonary ventilation. These same parameters were continuously monitored throughout the procedure.    A Tuberculosis risk assessment was performed:  The patient has no known RISK of Tuberculosis    The procedure was performed in a negative airflow room: The patient could not be moved to a negative airflow room because of needed OR for the procedure    Maneuvers / Procedure:      Airway Examination: A complete airway examination was performed from the distal trachea to the subsegmental level in each lobe of both lungs.  Pertinent findings include normal airways.         ION Robotic Bronchoscopy: Planning was performed on the laptop prior to the procedure. The ION successfully completed its pre-procedural check phase. The ION arm was oriented towards the ETT and docked successfully. The robotic catheter was inserted into the ETT and the guide was clamped down. Registration was then completed successfully. The catheter was advanced towards the MIMI Anterior lesion/nodule using the vision probe. Local confirmation of the lesion utilized with ENB-Targeting, Radial EBUS, and Fluoroscopy. Radial EBUS navigation: The 1.8mm 20MHz radial probe was inserted into the bronchoscope and used to navigate to the lesion. The lesion was concentric by ultrasound and fluoroscopy was used to confirm placement of the probe The lesion was biopsied using 22G FNA Needle. A total of 7 passes were performed. VALENTE was present throughout the procedure. EBL was minimal. Epinephrine was not administered.  Guide catheter and vision probe was removed successfully. The ION platform was undocked without issues.       Station 2R; not evaluated .   Station 2L; not evaluated .   Station 4R; visualized and biopsied. A total  of 4 biopsies were performed using 22G FNA Needle. .   Station 4L; visualized and biopsied. A total of 4 biopsies were performed using 22G FNA Needle. .   Station 7; visualized and biopsied. A total of 4 biopsies were performed using 22G FNA Needle. .   Station 10R; not evaluated .   Station 10L; not evaluated .   Station 11R; visualized and biopsied. A total of 4 biopsies were performed using 22G FNA Needle. .   Station 11L; visualized and biopsied. A total of 4 biopsies were performed using 22G FNA Needle. .   Station 12R; not evaluated .   Station 12L; not evaluated .     Angelita was Absent      Therapeutic suctioning: 15-20min of operative time was spent clearing out the airway of debris, blood and mucous prior to the intervention.     Any disposable equipment was visually inspected and deemed to be intact immediately post procedure.      Relevant Pictures      Assessment and Recommendations:     Successful completion of robotic bronchoscopy with transbronchial FNA and EBUS TBNA for stations 11R, 4R, 7, 4L, and 11L.   Ok to discharge once medically stable.   Follow up pathology results.           Bonny Arreola  Interventional pulmonary fellow  Pager: [phone]

## 2024-09-16 NOTE — ANESTHESIA POSTPROCEDURE EVALUATION
"Patient: Boby Ngo    Procedure: Procedure(s):  BRONCHOSCOPY, ROBOT-ASSISTED, WITH BIOPSY Left Upper Lobe mass and Endobronchial Ultrasound/lymph node biopsy       Anesthesia Type:  General    Note:  Disposition: Outpatient   Postop Pain Control: Uneventful            Sign Out: Well controlled pain   PONV: No   Neuro/Psych: Uneventful            Sign Out: Acceptable/Baseline neuro status   Airway/Respiratory: Uneventful            Sign Out: Acceptable/Baseline resp. status (Initially complained of \"tickle in throat\" cauing frequent coughing and throat clearing, resolved after lidocaine neb.)   CV/Hemodynamics: Uneventful            Sign Out: Acceptable CV status; No obvious hypovolemia; No obvious fluid overload   Other NRE: NONE   DID A NON-ROUTINE EVENT OCCUR? No           Last vitals:  Vitals Value Taken Time   /78 09/16/24 1549   Temp 36.8  C (98.3  F) 09/16/24 1444   Pulse 59 09/16/24 1554   Resp 11 09/16/24 1554   SpO2 100 % 09/16/24 1554   Vitals shown include unfiled device data.    Electronically Signed By: Esperanza Kinney DO  September 16, 2024  3:54 PM  "

## 2024-09-16 NOTE — TELEPHONE ENCOUNTER
Faxed bronch procedure note to Dr. Espinal at [facility] per provider request.     Fax #: [phone]     Fax confirmed: SENT (9/16/2024 at 4661).

## 2024-09-16 NOTE — ANESTHESIA PROCEDURE NOTES
Airway       Patient location during procedure: OR       Procedure Start/Stop Times: 9/16/2024 1:16 PM  Staff -        Anesthesiologist:  Alejandro Beckman MD       CRNA: Francine Coats APRN CRNA       Performed By: CRNAIndications and Patient Condition       Indications for airway management: mandy-procedural         Mask difficulty assessment: 2 - vent by mask + OA or adjuvant +/- NMBA    Final Airway Details       Final airway type: endotracheal airway       Successful airway: ETT - single  Endotracheal Airway Details        ETT size (mm): 8.5       Cuffed: yes       Successful intubation technique: direct laryngoscopy       DL Blade Type: Preciado 2       Grade View of Cords: 1       Adjucts: stylet       Position: Center       Measured from: lips       Secured at (cm): 23       Bite block used: None    Post intubation assessment        Placement verified by: capnometry, equal breath sounds and chest rise        Number of attempts at approach: 1       Number of other approaches attempted: 0       Secured with: commercial tube gibbs       Ease of procedure: easy       Dentition: Intact    Medication(s) Administered   Medication Administration Time: 9/16/2024 1:16 PM

## 2024-09-16 NOTE — DISCHARGE INSTRUCTIONS
Post Bronchoscopy Patient Instructions:    September 16, 2024  Boby Ngo    Your procedure completed ( robotic bronchoscopy with lung biopsy and EBUS TBNA) without any immediate complications.  You may cough up scant amount of blood for the next 12-24 hours. If you have excessive cough with blood, chest pain, shortness of breath, please report to the closest emergency room.    You may experience low grade (less than 100.5 F) fever next 24 hours, if so, you can take Tylenol. If the fever persists more than 24 hours, please contact to our office or your primary care provider.    Our office will call you with the results of samples taken during the procedure. Please note that you may get a result notification through  My Chart  before us calling you, as the Laboratories are instructed to release the results as soon as they are available to the patients and providers at the same time. Please allow your us 24-48 hours call you to discuss the results.    You may resume your diet as it was prior to procedure.    You may resume your medications after the procedure unless you are instructed to do differently.     Please follow instructions from the nursing staff upon discharge in terms of activity. In general, you should avoid any attention or motor skill requiring activities (e.g., driving or operating any motorized vehicle) for 24 hours as you might be still under the effect of sedation medications. Please make sure an adult to accompany you next 24 hours.     Should you have any question, please do not hesitate to call our office [place] Herrera [phone].     Please take a few moments to evaluate the care you received by me on this link: [email or web address]  Interventional Pulmonary Fellow        Contacting your Doctor -   To contact a doctor, call Dr.Joseph Gutiérrez @ [phone] or [phone] and ask for the resident on call for Pulmonology (answered 24 hours a day)    Emergency Department:  [facility]: [phone]  [facility]: [phone] if you are in need of immediate or emergent help

## 2024-09-16 NOTE — ANESTHESIA PREPROCEDURE EVALUATION
"Anesthesia Pre-Procedure Evaluation    Patient: Boby Ngo   MRN: [id number] : 1955        Procedure : Procedure(s):  BRONCHOSCOPY, ROBOT-ASSISTED, WITH BIOPSY Left Upper Lobe mass and Endobronchial Ultrasound/lymph node biopsy          Past Medical History:   Diagnosis Date    COPD (chronic obstructive pulmonary disease) (H)     Hypertension       History reviewed. No pertinent surgical history.   Allergies   Allergen Reactions    Penicillins Rash     Pt unsure of reaction      Social History     Tobacco Use    Smoking status: Every Day     Current packs/day: 1.00     Average packs/day: 1 pack/day for 51.7 years (51.7 ttl pk-yrs)     Types: Cigarettes     Start date:     Smokeless tobacco: Never   Substance Use Topics    Alcohol use: Yes      Wt Readings from Last 1 Encounters:   24 74 kg (163 lb 2.3 oz)        Anesthesia Evaluation   Pt has had prior anesthetic.     No history of anesthetic complications       ROS/MED HX  ENT/Pulmonary:     (+)                tobacco use, Current use,         COPD,              Neurologic:       Cardiovascular:     (+) Dyslipidemia hypertension- -   -  - -                                      METS/Exercise Tolerance:     Hematologic:       Musculoskeletal:   (+)  arthritis,             GI/Hepatic:     (+)           hepatitis (Treated) type C,        Renal/Genitourinary:     (+)        BPH,      Endo:       Psychiatric/Substance Use:     (+)   alcohol abuse      Infectious Disease:       Malignancy:       Other:            Physical Exam    Airway        Mallampati: II   TM distance: > 3 FB   Neck ROM: full   Mouth opening: > 3 cm    Respiratory Devices and Support         Dental       (+) Edentulous      Cardiovascular   cardiovascular exam normal          Pulmonary           (+) wheezes           OUTSIDE LABS:  CBC: No results found for: \"WBC\", \"HGB\", \"HCT\", \"PLT\"  BMP: No results found for: \"NA\", \"POTASSIUM\", \"CHLORIDE\", \"CO2\", \"BUN\", \"CR\", " "\"GLC\"  COAGS: No results found for: \"PTT\", \"INR\", \"FIBR\"  POC: No results found for: \"BGM\", \"HCG\", \"HCGS\"  HEPATIC: No results found for: \"ALBUMIN\", \"PROTTOTAL\", \"ALT\", \"AST\", \"GGT\", \"ALKPHOS\", \"BILITOTAL\", \"BILIDIRECT\", \"YAMIL\"  OTHER: No results found for: \"PH\", \"LACT\", \"A1C\", \"SCOTT\", \"PHOS\", \"MAG\", \"LIPASE\", \"AMYLASE\", \"TSH\", \"T4\", \"T3\", \"CRP\", \"SED\"    Anesthesia Plan    ASA Status:  3    NPO Status:  NPO Appropriate    Anesthesia Type: General.     - Airway: ETT   Induction: Intravenous, Propofol.   Maintenance: TIVA.        Consents    Anesthesia Plan(s) and associated risks, benefits, and realistic alternatives discussed. Questions answered and patient/representative(s) expressed understanding.     - Discussed:     - Discussed with:  Patient      - Extended Intubation/Ventilatory Support Discussed: No.      - Patient is DNR/DNI Status: No          Postoperative Care    Pain management: IV analgesics.   PONV prophylaxis: Ondansetron (or other 5HT-3), Background Propofol Infusion, Dexamethasone or Solumedrol     Comments:               Alejandro Beckman MD    I have reviewed the pertinent notes and labs in the chart from the past 30 days and (re)examined the patient.  Any updates or changes from those notes are reflected in this note.             # Drug Induced Platelet Defect: home medication list includes an antiplatelet medication      "

## 2024-09-19 ENCOUNTER — CARE COORDINATION (OUTPATIENT)
Dept: PULMONOLOGY | Facility: CLINIC | Age: 69
End: 2024-09-19

## 2024-09-19 ENCOUNTER — TELEPHONE (OUTPATIENT)
Dept: PULMONOLOGY | Facility: CLINIC | Age: 69
End: 2024-09-19

## 2024-09-19 LAB
INTERPRETATION: NORMAL
LAB DIRECTOR COMMENTS: NORMAL
LAB DIRECTOR DISCLAIMER: NORMAL
LAB DIRECTOR INTERPRETATION: NORMAL
LAB DIRECTOR METHODOLOGY: NORMAL
LAB DIRECTOR RESULTS: NORMAL
SIGNIFICANT RESULTS: NORMAL
SPECIMEN DESCRIPTION: NORMAL
SPECIMEN DESCRIPTION: NORMAL
TEST DETAILS, MDL: NORMAL

## 2024-09-19 NOTE — RESULT ENCOUNTER NOTE
I just got off the phone with him and unfortunately he said  [facility] so we're getting everything and my recommendations faxed to his PCP. Nothing for you to do.

## 2024-09-19 NOTE — CONFIDENTIAL NOTE
Pulmonary Telephone note    9/16/24 Biopsy results reviewed with patient over the phone as below. He also has a 9mm RLL groundglass nodule also noted on CT chest 9/16/2024, stable from 7/2024.   I recommend PET and a MRI brain w/ and w/ out contrast, if those are negative for metastasis then recommend referral to thoracic surgery (continue surveillance of the 9mm RLL groundglass nodule if not PET avid). If concerning for metastasis would recommend referral to Medical Oncology. [facility].     Fax [phone] attn Chan Boston  Recommend they fax MRI brain and PET results to our office after completed      Final Diagnosis   Specimen A  Lung, Upper Lobe, Left, Left upper lobe nodule, Fine Needle Aspirate:                 Interpretation:                  Positive for malignancy  Non-small cell carcinoma compatible with adenocarcinoma  (see comment)                  Adequacy:                 Satisfactory for evaluation        Specimen B  Lymph Node(s), 11R, Fine Needle Aspirate:                 Interpretation:                  Negative for malignancy  Polymorphous population of lymphocytes present                 Adequacy:                 Satisfactory for evaluation        Specimen C  Lymph Node(s), 4R, Fine Needle Aspirate:                 Interpretation:                  Negative for malignancy  Polymorphous population of lymphocytes present                 Adequacy:                 Satisfactory for evaluation        Specimen D  Lymph Node(s), station 7, Fine Needle Aspirate:                 Interpretation:                  Negative for malignancy  Polymorphous population of lymphocytes present                 Adequacy:                 Satisfactory for evaluation but limited by:, Scant cellularity        Specimen E  Lymph Node(s), 4L, Fine Needle Aspirate:                 Interpretation:                   Negative for malignancy  Polymorphous population of lymphocytes present                 Adequacy:                 Satisfactory for evaluation        Specimen F   Lymph Node(s), 11L, Fine Needle Aspirate:                 Interpretation:                  Negative for malignancy  Polymorphous population of lymphocytes present                 Adequacy:                 Satisfactory for evaluation         Electronically signed by Bo Cordova III, MD on 9/19/2024 at 12:33 PM   Comment    Tumor cells are positive for TTF-1 and negative for p40 and melanA. 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 Staining controls are appropriately reactive.  PD-L1 report to follow and will be reported in an addendum. Molecular report is ordered and will be issued separately.

## 2024-09-19 NOTE — TELEPHONE ENCOUNTER
Contacted patient's PCP office, Dr. Chan Boston with the [facility],  to discuss biopsy results. Spoke with nurse Christie. Informed Christie of results and provider recommendations for next steps. Christie has requested that results/recommendations be faxed to Dr. Boston's office to fax # [phone]. Results have been faxed.    Best Almanza LPN  Pulmonary Medicine:  [facility]  Phone: [phone] Fax: [phone]

## 2024-09-19 NOTE — PROGRESS NOTES
FNA results forwarded to Karen Anguiano PA-C. Requesting she update patient on results/next steps.       Fine Needle Aspirate Lung, Upper Lobe, Left: [id number]  Order: [id number]  Status: Final result       Visible to patient: No (inaccessible in MyChart)    1 Result Note      Component  Ref Range & Units    Final Diagnosis   Specimen A  Lung, Upper Lobe, Left, Left upper lobe nodule, Fine Needle Aspirate:                 Interpretation:                  Positive for malignancy  Non-small cell carcinoma compatible with adenocarcinoma  (see comment)                  Adequacy:                 Satisfactory for evaluation        Specimen B  Lymph Node(s), 11R, Fine Needle Aspirate:                 Interpretation:                  Negative for malignancy  Polymorphous population of lymphocytes present                 Adequacy:                 Satisfactory for evaluation        Specimen C  Lymph Node(s), 4R, Fine Needle Aspirate:                 Interpretation:                  Negative for malignancy  Polymorphous population of lymphocytes present                 Adequacy:                 Satisfactory for evaluation        Specimen D  Lymph Node(s), station 7, Fine Needle Aspirate:                 Interpretation:                  Negative for malignancy  Polymorphous population of lymphocytes present                 Adequacy:                 Satisfactory for evaluation but limited by:, Scant cellularity        Specimen E  Lymph Node(s), 4L, Fine Needle Aspirate:                 Interpretation:                  Negative for malignancy  Polymorphous population of lymphocytes present                 Adequacy:                 Satisfactory for evaluation        Specimen F   Lymph Node(s), 11L, Fine Needle Aspirate:                 Interpretation:                  Negative for malignancy  Polymorphous population of lymphocytes present                 Adequacy:                 Satisfactory for evaluation

## 2024-09-19 NOTE — TELEPHONE ENCOUNTER
----- Message from Geneva BECERRA sent at 9/19/2024 12:34 PM CDT -----  Caleb Jones,    Here is his FNA results.     Can you let patient know results/next steps.     Let me know what orders you need/if any.    Thanks,  Geneva

## 2024-09-20 LAB
PATH REPORT.ADDENDUM SPEC: ABNORMAL
PATH REPORT.COMMENTS IMP SPEC: ABNORMAL
PATH REPORT.COMMENTS IMP SPEC: YES
PATH REPORT.FINAL DX SPEC: ABNORMAL
PATH REPORT.GROSS SPEC: ABNORMAL
PATH REPORT.RELEVANT HX SPEC: ABNORMAL

## 2024-09-20 PROCEDURE — G0452 MOLECULAR PATHOLOGY INTERPR: HCPCS | Mod: 26 | Performed by: PATHOLOGY

## 2024-09-20 PROCEDURE — 81445 SO NEO GSAP 5-50DNA/DNA&RNA: CPT | Performed by: INTERNAL MEDICINE

## 2024-09-20 PROCEDURE — 81455 SO/HL 51/>GSAP DNA/DNA&RNA: CPT | Performed by: INTERNAL MEDICINE

## 2024-09-24 ENCOUNTER — TRANSFERRED RECORDS (OUTPATIENT)
Dept: HEALTH INFORMATION MANAGEMENT | Facility: CLINIC | Age: 69
End: 2024-09-24

## 2024-09-26 NOTE — TELEPHONE ENCOUNTER
Pt calling to informed clinic that he had MRI brain at [facility] on 9/24. Pt states that, per imaging, it would take 3-4 days for results to process. Pt provided pulm clinic fax # and results will be sent to clinic. Pt notes that [facility].

## 2024-09-30 NOTE — TELEPHONE ENCOUNTER
Provider informed of this information.    Best Almanza LPN  Pulmonary Medicine:  [facility]  Phone: [phone] Fax: [phone]

## (undated) DEVICE — SYR 10ML SLIP TIP W/O NDL 303134

## (undated) DEVICE — KIT ENDO FIRST STEP DISINFECTANT 200ML W/POUCH EP-4

## (undated) DEVICE — ENDO VALVE SUCTION BRONCH EVIS MAJ-209

## (undated) DEVICE — CONNECTOR SWIVEL 7.0MM ION 490108

## (undated) DEVICE — BAG INSTRUMENT IV VISION ION 490127

## (undated) DEVICE — SYR 30ML SLIP TIP W/O NDL 302833

## (undated) DEVICE — NDL BIOPSY 23G FLEXISION ION 490102

## (undated) DEVICE — ADAPTER PROBE/SUCTION VISION ION 490101

## (undated) DEVICE — NDL ASPIRATION EBUS-VIZISHOT 22G NA-U401SX-4022-A

## (undated) DEVICE — LINEN TOWEL PACK X5 5464

## (undated) DEVICE — ENDO VALVE BX EVIS MAJ-210

## (undated) DEVICE — ENDO CATHETER ION 490105

## (undated) DEVICE — SOL NACL 0.9% IRRIG 1000ML BOTTLE 2F7124

## (undated) DEVICE — PROBE PERIPHERAL VISION ION 490206

## (undated) DEVICE — LABEL MEDICATION SYSTEM 3303-P

## (undated) DEVICE — TUBING SUCTION 10'X3/16" N510

## (undated) DEVICE — PITCHER STERILE 1000ML  SSK9004A

## (undated) RX ORDER — FENTANYL CITRATE 50 UG/ML
INJECTION, SOLUTION INTRAMUSCULAR; INTRAVENOUS
Status: DISPENSED
Start: 2024-09-16

## (undated) RX ORDER — FENTANYL CITRATE-0.9 % NACL/PF 10 MCG/ML
PLASTIC BAG, INJECTION (ML) INTRAVENOUS
Status: DISPENSED
Start: 2024-09-16

## (undated) RX ORDER — ONDANSETRON 2 MG/ML
INJECTION INTRAMUSCULAR; INTRAVENOUS
Status: DISPENSED
Start: 2024-09-16

## (undated) RX ORDER — PROPOFOL 10 MG/ML
INJECTION, EMULSION INTRAVENOUS
Status: DISPENSED
Start: 2024-09-16

## (undated) RX ORDER — HYDROMORPHONE HYDROCHLORIDE 1 MG/ML
INJECTION, SOLUTION INTRAMUSCULAR; INTRAVENOUS; SUBCUTANEOUS
Status: DISPENSED
Start: 2024-09-16

## (undated) RX ORDER — LIDOCAINE HYDROCHLORIDE 40 MG/ML
INJECTION, SOLUTION RETROBULBAR
Status: DISPENSED
Start: 2024-09-16